# Patient Record
Sex: FEMALE | Race: WHITE
[De-identification: names, ages, dates, MRNs, and addresses within clinical notes are randomized per-mention and may not be internally consistent; named-entity substitution may affect disease eponyms.]

---

## 2022-08-08 ENCOUNTER — HOSPITAL ENCOUNTER (EMERGENCY)
Dept: HOSPITAL 26 - MED | Age: 22
Discharge: HOME | End: 2022-08-08
Payer: COMMERCIAL

## 2022-08-08 VITALS — DIASTOLIC BLOOD PRESSURE: 70 MMHG | SYSTOLIC BLOOD PRESSURE: 107 MMHG

## 2022-08-08 VITALS — SYSTOLIC BLOOD PRESSURE: 144 MMHG | DIASTOLIC BLOOD PRESSURE: 120 MMHG

## 2022-08-08 VITALS — WEIGHT: 108 LBS | BODY MASS INDEX: 21.77 KG/M2 | HEIGHT: 59 IN

## 2022-08-08 DIAGNOSIS — D64.9: ICD-10-CM

## 2022-08-08 DIAGNOSIS — Z72.89: ICD-10-CM

## 2022-08-08 DIAGNOSIS — F41.9: ICD-10-CM

## 2022-08-08 DIAGNOSIS — K29.20: Primary | ICD-10-CM

## 2022-08-08 DIAGNOSIS — F17.200: ICD-10-CM

## 2022-08-08 LAB
ALBUMIN FLD-MCNC: 5.2 G/DL (ref 3.4–5)
ANION GAP SERPL CALCULATED.3IONS-SCNC: 20.5 MMOL/L (ref 8–16)
AST SERPL-CCNC: 27 U/L (ref 15–37)
BASOPHILS # BLD AUTO: 0 K/UL (ref 0–0.22)
BASOPHILS NFR BLD AUTO: 0.3 % (ref 0–2)
BILIRUB SERPL-MCNC: 0.9 MG/DL (ref 0–1)
BUN SERPL-MCNC: 9 MG/DL (ref 7–18)
CHLORIDE SERPL-SCNC: 107 MMOL/L (ref 98–107)
CO2 SERPL-SCNC: 22 MMOL/L (ref 21–32)
CREAT SERPL-MCNC: 0.8 MG/DL (ref 0.6–1.3)
EOSINOPHIL # BLD AUTO: 0 K/UL (ref 0–0.4)
EOSINOPHIL NFR BLD AUTO: 0.1 % (ref 0–4)
ERYTHROCYTE [DISTWIDTH] IN BLOOD BY AUTOMATED COUNT: 12.9 % (ref 11.6–13.7)
GFR SERPL CREATININE-BSD FRML MDRD: 118 ML/MIN (ref 90–?)
GLUCOSE SERPL-MCNC: 102 MG/DL (ref 74–106)
HCT VFR BLD AUTO: 44.7 % (ref 36–48)
HGB BLD-MCNC: 15.1 G/DL (ref 12–16)
LIPASE SERPL-CCNC: 85 U/L (ref 73–393)
LYMPHOCYTES # BLD AUTO: 1.4 K/UL (ref 2.5–16.5)
LYMPHOCYTES NFR BLD AUTO: 15.4 % (ref 20.5–51.1)
MCH RBC QN AUTO: 30 PG (ref 27–31)
MCHC RBC AUTO-ENTMCNC: 34 G/DL (ref 33–37)
MCV RBC AUTO: 88.7 FL (ref 80–94)
MONOCYTES # BLD AUTO: 0.4 K/UL (ref 0.8–1)
MONOCYTES NFR BLD AUTO: 4.8 % (ref 1.7–9.3)
NEUTROPHILS # BLD AUTO: 7.1 K/UL (ref 1.8–7.7)
NEUTROPHILS NFR BLD AUTO: 79.4 % (ref 42.2–75.2)
PLATELET # BLD AUTO: 269 K/UL (ref 140–450)
POTASSIUM SERPL-SCNC: 4.5 MMOL/L (ref 3.5–5.1)
RBC # BLD AUTO: 5.05 MIL/UL (ref 4.2–5.4)
SODIUM SERPL-SCNC: 145 MMOL/L (ref 136–145)
WBC # BLD AUTO: 9 K/UL (ref 4.5–11)

## 2022-08-08 PROCEDURE — 96361 HYDRATE IV INFUSION ADD-ON: CPT

## 2022-08-08 PROCEDURE — 36415 COLL VENOUS BLD VENIPUNCTURE: CPT

## 2022-08-08 PROCEDURE — 96374 THER/PROPH/DIAG INJ IV PUSH: CPT

## 2022-08-08 PROCEDURE — 80053 COMPREHEN METABOLIC PANEL: CPT

## 2022-08-08 PROCEDURE — 96375 TX/PRO/DX INJ NEW DRUG ADDON: CPT

## 2022-08-08 PROCEDURE — 83690 ASSAY OF LIPASE: CPT

## 2022-08-08 PROCEDURE — 81025 URINE PREGNANCY TEST: CPT

## 2022-08-08 PROCEDURE — 85025 COMPLETE CBC W/AUTO DIFF WBC: CPT

## 2022-08-08 PROCEDURE — 99284 EMERGENCY DEPT VISIT MOD MDM: CPT

## 2022-08-08 NOTE — NUR
21 y/o female bib self with c/o abdominal pain x yeterday. Per patient she 
drank a large amount of alcohol yesterday and blacked out. Patient is unsure 
how much she drank. +nausea, +vomiting. Patient denies any fever, chills, SOB 
or being around anyone who is sick. Per patient her vomiting has blood in it. 



Medical History: Denies

NKDA

## 2022-08-08 NOTE — NUR
Patient discharged with v/s stable. Written and verbal after care instructions 
given and explained. 

Patient alert, oriented and verbalized understanding of instructions. 
Ambulatory with steady gait. All questions addressed prior to discharge. ID 
band removed. Patient advised to follow up with PMD. Rx of pepcid & zofran 
given. Patient educated on indication of medication including possible reaction 
and side effects. Opportunity to ask questions provided and answered.

## 2023-02-18 ENCOUNTER — HOSPITAL ENCOUNTER (EMERGENCY)
Dept: HOSPITAL 26 - MED | Age: 23
Discharge: HOME | End: 2023-02-18
Payer: COMMERCIAL

## 2023-02-18 VITALS — HEIGHT: 59 IN | WEIGHT: 107 LBS | BODY MASS INDEX: 21.57 KG/M2

## 2023-02-18 VITALS — SYSTOLIC BLOOD PRESSURE: 118 MMHG | DIASTOLIC BLOOD PRESSURE: 79 MMHG

## 2023-02-18 VITALS — SYSTOLIC BLOOD PRESSURE: 116 MMHG | DIASTOLIC BLOOD PRESSURE: 85 MMHG

## 2023-02-18 DIAGNOSIS — J02.9: Primary | ICD-10-CM

## 2023-02-18 DIAGNOSIS — Z20.822: ICD-10-CM

## 2023-02-18 NOTE — NUR
Patient discharged with v/s stable. Written and verbal after care instructions 
given and explained. 

Patient alert, oriented and verbalized understanding of instructions. 
Ambulatory with steady gait. All questions addressed prior to discharge. ID 
band removed. Patient advised to follow up with PMD. Rx of AMOXICILLIN, 
CEPACOL, IBUPROFEN given. Patient educated on indication of medication 
including possible reaction and side effects. Opportunity to ask questions 
provided and answered.